# Patient Record
Sex: MALE | Race: WHITE | NOT HISPANIC OR LATINO
[De-identification: names, ages, dates, MRNs, and addresses within clinical notes are randomized per-mention and may not be internally consistent; named-entity substitution may affect disease eponyms.]

---

## 2020-06-16 ENCOUNTER — APPOINTMENT (OUTPATIENT)
Dept: GASTROENTEROLOGY | Facility: CLINIC | Age: 60
End: 2020-06-16
Payer: COMMERCIAL

## 2020-06-16 VITALS
HEART RATE: 79 BPM | DIASTOLIC BLOOD PRESSURE: 83 MMHG | SYSTOLIC BLOOD PRESSURE: 122 MMHG | BODY MASS INDEX: 29.82 KG/M2 | OXYGEN SATURATION: 98 % | TEMPERATURE: 97.7 F | WEIGHT: 179 LBS | HEIGHT: 65 IN

## 2020-06-16 DIAGNOSIS — Z86.39 PERSONAL HISTORY OF OTHER ENDOCRINE, NUTRITIONAL AND METABOLIC DISEASE: ICD-10-CM

## 2020-06-16 DIAGNOSIS — Z86.79 PERSONAL HISTORY OF OTHER DISEASES OF THE CIRCULATORY SYSTEM: ICD-10-CM

## 2020-06-16 DIAGNOSIS — K43.9 VENTRAL HERNIA W/OUT OBSTRUCTION OR GANGRENE: ICD-10-CM

## 2020-06-16 DIAGNOSIS — Z12.11 ENCOUNTER FOR SCREENING FOR MALIGNANT NEOPLASM OF COLON: ICD-10-CM

## 2020-06-16 PROCEDURE — 99203 OFFICE O/P NEW LOW 30 MIN: CPT

## 2020-06-16 RX ORDER — PROPRANOLOL HYDROCHLORIDE 80 MG/1
TABLET ORAL
Refills: 0 | Status: ACTIVE | COMMUNITY

## 2020-06-16 RX ORDER — SOLIFENACIN SUCCINATE 5 MG/1
5 TABLET, FILM COATED ORAL
Refills: 0 | Status: ACTIVE | COMMUNITY

## 2020-06-16 RX ORDER — OMEPRAZOLE 20 MG/1
20 CAPSULE, DELAYED RELEASE ORAL
Refills: 0 | Status: ACTIVE | COMMUNITY

## 2020-06-16 RX ORDER — SODIUM PICOSULFATE, MAGNESIUM OXIDE, AND ANHYDROUS CITRIC ACID 10; 3.5; 12 MG/160ML; G/160ML; G/160ML
10-3.5-12 MG-GM LIQUID ORAL
Qty: 1 | Refills: 0 | Status: ACTIVE | COMMUNITY
Start: 2020-06-16 | End: 1900-01-01

## 2020-06-16 RX ORDER — GLIPIZIDE 2.5 MG/1
TABLET ORAL
Refills: 0 | Status: ACTIVE | COMMUNITY

## 2020-06-16 NOTE — HISTORY OF PRESENT ILLNESS
[FreeTextEntry1] : Patient is a 60-year-old gentleman with history of hypertension and diabetes who presents for screening colonoscopy.  His bowel movements are regular.  He has no abdominal pain.  He denies seeing any blood or mucus in the stool.\par Patient has no upper gastrointestinal symptoms such as heartburn, dysphagia, early satiety, or nausea or vomiting.

## 2020-06-16 NOTE — PHYSICAL EXAM
[General Appearance - Alert] : alert [General Appearance - In No Acute Distress] : in no acute distress [Sclera] : the sclera and conjunctiva were normal [PERRL With Normal Accommodation] : pupils were equal in size, round, and reactive to light [Extraocular Movements] : extraocular movements were intact [Outer Ear] : the ears and nose were normal in appearance [Oropharynx] : the oropharynx was normal [Neck Appearance] : the appearance of the neck was normal [Neck Cervical Mass (___cm)] : no neck mass was observed [Jugular Venous Distention Increased] : there was no jugular-venous distention [Thyroid Diffuse Enlargement] : the thyroid was not enlarged [Thyroid Nodule] : there were no palpable thyroid nodules [Auscultation Breath Sounds / Voice Sounds] : lungs were clear to auscultation bilaterally [Heart Rate And Rhythm] : heart rate was normal and rhythm regular [Heart Sounds] : normal S1 and S2 [Heart Sounds Gallop] : no gallops [Murmurs] : no murmurs [Heart Sounds Pericardial Friction Rub] : no pericardial rub [Bowel Sounds] : normal bowel sounds [Abdomen Soft] : soft [Abdomen Tenderness] : non-tender [] : no hepato-splenomegaly [Abdomen Mass (___ Cm)] : no abdominal mass palpated [FreeTextEntry1] : Reducible ventral hernia [No CVA Tenderness] : no ~M costovertebral angle tenderness [No Spinal Tenderness] : no spinal tenderness [Abnormal Walk] : normal gait [Nail Clubbing] : no clubbing  or cyanosis of the fingernails [Musculoskeletal - Swelling] : no joint swelling seen [Motor Tone] : muscle strength and tone were normal [Oriented To Time, Place, And Person] : oriented to person, place, and time [Impaired Insight] : insight and judgment were intact [Affect] : the affect was normal

## 2020-07-12 DIAGNOSIS — Z01.818 ENCOUNTER FOR OTHER PREPROCEDURAL EXAMINATION: ICD-10-CM

## 2020-07-13 ENCOUNTER — APPOINTMENT (OUTPATIENT)
Dept: DISASTER EMERGENCY | Facility: CLINIC | Age: 60
End: 2020-07-13

## 2020-07-13 LAB — SARS-COV-2 N GENE NPH QL NAA+PROBE: NOT DETECTED

## 2020-07-16 ENCOUNTER — APPOINTMENT (OUTPATIENT)
Dept: GASTROENTEROLOGY | Facility: AMBULATORY MEDICAL SERVICES | Age: 60
End: 2020-07-16
Payer: COMMERCIAL

## 2020-07-16 PROCEDURE — 45378 DIAGNOSTIC COLONOSCOPY: CPT | Mod: 33

## 2020-12-23 PROBLEM — Z12.11 ENCOUNTER FOR SCREENING COLONOSCOPY: Status: RESOLVED | Noted: 2020-06-16 | Resolved: 2020-12-23

## 2022-03-26 ENCOUNTER — TRANSCRIPTION ENCOUNTER (OUTPATIENT)
Age: 62
End: 2022-03-26

## 2023-11-27 ENCOUNTER — APPOINTMENT (OUTPATIENT)
Dept: PODIATRY | Facility: CLINIC | Age: 63
End: 2023-11-27
Payer: COMMERCIAL

## 2023-11-27 DIAGNOSIS — M79.672 PAIN IN LEFT FOOT: ICD-10-CM

## 2023-11-27 DIAGNOSIS — M10.9 GOUT, UNSPECIFIED: ICD-10-CM

## 2023-11-27 PROCEDURE — 11720 DEBRIDE NAIL 1-5: CPT | Mod: 59

## 2023-11-27 PROCEDURE — 20605 DRAIN/INJ JOINT/BURSA W/O US: CPT | Mod: LT

## 2023-11-27 PROCEDURE — 99202 OFFICE O/P NEW SF 15 MIN: CPT | Mod: 25

## 2023-11-27 RX ORDER — COLCHICINE 0.6 MG/1
0.6 TABLET ORAL DAILY
Qty: 10 | Refills: 0 | Status: ACTIVE | COMMUNITY
Start: 2023-11-27 | End: 1900-01-01

## 2023-11-29 PROBLEM — M79.672 LEFT FOOT PAIN: Status: ACTIVE | Noted: 2023-11-28

## 2024-02-07 ENCOUNTER — INPATIENT (INPATIENT)
Facility: HOSPITAL | Age: 64
LOS: 0 days | Discharge: HOME | DRG: 641 | End: 2024-02-08
Attending: STUDENT IN AN ORGANIZED HEALTH CARE EDUCATION/TRAINING PROGRAM | Admitting: STUDENT IN AN ORGANIZED HEALTH CARE EDUCATION/TRAINING PROGRAM
Payer: COMMERCIAL

## 2024-02-07 VITALS
RESPIRATION RATE: 18 BRPM | TEMPERATURE: 98 F | HEIGHT: 65 IN | WEIGHT: 156.09 LBS | OXYGEN SATURATION: 100 % | HEART RATE: 89 BPM | DIASTOLIC BLOOD PRESSURE: 93 MMHG | SYSTOLIC BLOOD PRESSURE: 156 MMHG

## 2024-02-07 DIAGNOSIS — E66.3 OVERWEIGHT: ICD-10-CM

## 2024-02-07 DIAGNOSIS — Z90.49 ACQUIRED ABSENCE OF OTHER SPECIFIED PARTS OF DIGESTIVE TRACT: Chronic | ICD-10-CM

## 2024-02-07 DIAGNOSIS — L02.215 CUTANEOUS ABSCESS OF PERINEUM: ICD-10-CM

## 2024-02-07 DIAGNOSIS — E11.9 TYPE 2 DIABETES MELLITUS WITHOUT COMPLICATIONS: ICD-10-CM

## 2024-02-07 DIAGNOSIS — E87.5 HYPERKALEMIA: ICD-10-CM

## 2024-02-07 DIAGNOSIS — Z87.438 PERSONAL HISTORY OF OTHER DISEASES OF MALE GENITAL ORGANS: ICD-10-CM

## 2024-02-07 DIAGNOSIS — K59.00 CONSTIPATION, UNSPECIFIED: ICD-10-CM

## 2024-02-07 DIAGNOSIS — N17.9 ACUTE KIDNEY FAILURE, UNSPECIFIED: ICD-10-CM

## 2024-02-07 DIAGNOSIS — I10 ESSENTIAL (PRIMARY) HYPERTENSION: ICD-10-CM

## 2024-02-07 DIAGNOSIS — Z29.9 ENCOUNTER FOR PROPHYLACTIC MEASURES, UNSPECIFIED: ICD-10-CM

## 2024-02-07 LAB
ALBUMIN SERPL ELPH-MCNC: 4.4 G/DL — SIGNIFICANT CHANGE UP (ref 3.3–5)
ALP SERPL-CCNC: 85 U/L — SIGNIFICANT CHANGE UP (ref 40–120)
ALT FLD-CCNC: 17 U/L — SIGNIFICANT CHANGE UP (ref 10–45)
ANION GAP SERPL CALC-SCNC: 11 MMOL/L — SIGNIFICANT CHANGE UP (ref 5–17)
ANION GAP SERPL CALC-SCNC: 12 MMOL/L — SIGNIFICANT CHANGE UP (ref 5–17)
APPEARANCE UR: CLEAR — SIGNIFICANT CHANGE UP
AST SERPL-CCNC: 14 U/L — SIGNIFICANT CHANGE UP (ref 10–40)
BACTERIA # UR AUTO: NEGATIVE /HPF — SIGNIFICANT CHANGE UP
BILIRUB SERPL-MCNC: 0.4 MG/DL — SIGNIFICANT CHANGE UP (ref 0.2–1.2)
BILIRUB UR-MCNC: NEGATIVE — SIGNIFICANT CHANGE UP
BUN SERPL-MCNC: 33 MG/DL — HIGH (ref 7–23)
BUN SERPL-MCNC: 33 MG/DL — HIGH (ref 7–23)
CALCIUM SERPL-MCNC: 10.3 MG/DL — SIGNIFICANT CHANGE UP (ref 8.4–10.5)
CALCIUM SERPL-MCNC: 9.8 MG/DL — SIGNIFICANT CHANGE UP (ref 8.4–10.5)
CAST: 0 /LPF — SIGNIFICANT CHANGE UP (ref 0–4)
CHLORIDE SERPL-SCNC: 101 MMOL/L — SIGNIFICANT CHANGE UP (ref 96–108)
CHLORIDE SERPL-SCNC: 104 MMOL/L — SIGNIFICANT CHANGE UP (ref 96–108)
CK SERPL-CCNC: 31 U/L — SIGNIFICANT CHANGE UP (ref 30–200)
CO2 SERPL-SCNC: 21 MMOL/L — LOW (ref 22–31)
CO2 SERPL-SCNC: 22 MMOL/L — SIGNIFICANT CHANGE UP (ref 22–31)
COLOR SPEC: YELLOW — SIGNIFICANT CHANGE UP
CREAT ?TM UR-MCNC: 82 MG/DL — SIGNIFICANT CHANGE UP
CREAT SERPL-MCNC: 2.17 MG/DL — HIGH (ref 0.5–1.3)
CREAT SERPL-MCNC: 2.31 MG/DL — HIGH (ref 0.5–1.3)
DIFF PNL FLD: NEGATIVE — SIGNIFICANT CHANGE UP
EGFR: 31 ML/MIN/1.73M2 — LOW
EGFR: 33 ML/MIN/1.73M2 — LOW
GLUCOSE BLDC GLUCOMTR-MCNC: 196 MG/DL — HIGH (ref 70–99)
GLUCOSE BLDC GLUCOMTR-MCNC: 224 MG/DL — HIGH (ref 70–99)
GLUCOSE SERPL-MCNC: 208 MG/DL — HIGH (ref 70–99)
GLUCOSE SERPL-MCNC: 92 MG/DL — SIGNIFICANT CHANGE UP (ref 70–99)
GLUCOSE UR QL: 100 MG/DL
HCT VFR BLD CALC: 33.9 % — LOW (ref 39–50)
HGB BLD-MCNC: 11.6 G/DL — LOW (ref 13–17)
KETONES UR-MCNC: NEGATIVE MG/DL — SIGNIFICANT CHANGE UP
LEUKOCYTE ESTERASE UR-ACNC: NEGATIVE — SIGNIFICANT CHANGE UP
MCHC RBC-ENTMCNC: 31.2 PG — SIGNIFICANT CHANGE UP (ref 27–34)
MCHC RBC-ENTMCNC: 34.2 GM/DL — SIGNIFICANT CHANGE UP (ref 32–36)
MCV RBC AUTO: 91.1 FL — SIGNIFICANT CHANGE UP (ref 80–100)
NITRITE UR-MCNC: NEGATIVE — SIGNIFICANT CHANGE UP
NRBC # BLD: 0 /100 WBCS — SIGNIFICANT CHANGE UP (ref 0–0)
PH UR: 7 — SIGNIFICANT CHANGE UP (ref 5–8)
PLATELET # BLD AUTO: 261 K/UL — SIGNIFICANT CHANGE UP (ref 150–400)
POTASSIUM SERPL-MCNC: 5.4 MMOL/L — HIGH (ref 3.5–5.3)
POTASSIUM SERPL-MCNC: 5.8 MMOL/L — HIGH (ref 3.5–5.3)
POTASSIUM SERPL-SCNC: 5.4 MMOL/L — HIGH (ref 3.5–5.3)
POTASSIUM SERPL-SCNC: 5.8 MMOL/L — HIGH (ref 3.5–5.3)
POTASSIUM UR-SCNC: 44 MMOL/L — SIGNIFICANT CHANGE UP
PROT ?TM UR-MCNC: 13 MG/DL — HIGH (ref 0–12)
PROT SERPL-MCNC: 7.2 G/DL — SIGNIFICANT CHANGE UP (ref 6–8.3)
PROT UR-MCNC: NEGATIVE MG/DL — SIGNIFICANT CHANGE UP
RBC # BLD: 3.72 M/UL — LOW (ref 4.2–5.8)
RBC # FLD: 12.6 % — SIGNIFICANT CHANGE UP (ref 10.3–14.5)
RBC CASTS # UR COMP ASSIST: 0 /HPF — SIGNIFICANT CHANGE UP (ref 0–4)
SODIUM SERPL-SCNC: 134 MMOL/L — LOW (ref 135–145)
SODIUM SERPL-SCNC: 137 MMOL/L — SIGNIFICANT CHANGE UP (ref 135–145)
SODIUM UR-SCNC: 143 MMOL/L — SIGNIFICANT CHANGE UP
SP GR SPEC: 1.02 — SIGNIFICANT CHANGE UP (ref 1–1.03)
SQUAMOUS # UR AUTO: 0 /HPF — SIGNIFICANT CHANGE UP (ref 0–5)
UROBILINOGEN FLD QL: 0.2 MG/DL — SIGNIFICANT CHANGE UP (ref 0.2–1)
WBC # BLD: 7.48 K/UL — SIGNIFICANT CHANGE UP (ref 3.8–10.5)
WBC # FLD AUTO: 7.48 K/UL — SIGNIFICANT CHANGE UP (ref 3.8–10.5)
WBC UR QL: 0 /HPF — SIGNIFICANT CHANGE UP (ref 0–5)

## 2024-02-07 PROCEDURE — 99285 EMERGENCY DEPT VISIT HI MDM: CPT

## 2024-02-07 PROCEDURE — 99223 1ST HOSP IP/OBS HIGH 75: CPT

## 2024-02-07 RX ORDER — DEXTROSE 50 % IN WATER 50 %
15 SYRINGE (ML) INTRAVENOUS ONCE
Refills: 0 | Status: DISCONTINUED | OUTPATIENT
Start: 2024-02-07 | End: 2024-02-08

## 2024-02-07 RX ORDER — OXYBUTYNIN CHLORIDE 5 MG
5 TABLET ORAL
Refills: 0 | Status: DISCONTINUED | OUTPATIENT
Start: 2024-02-07 | End: 2024-02-08

## 2024-02-07 RX ORDER — ASPIRIN/CALCIUM CARB/MAGNESIUM 324 MG
81 TABLET ORAL DAILY
Refills: 0 | Status: DISCONTINUED | OUTPATIENT
Start: 2024-02-07 | End: 2024-02-08

## 2024-02-07 RX ORDER — LISINOPRIL 2.5 MG/1
1 TABLET ORAL
Refills: 0 | DISCHARGE

## 2024-02-07 RX ORDER — CHOLECALCIFEROL (VITAMIN D3) 125 MCG
2000 CAPSULE ORAL DAILY
Refills: 0 | Status: DISCONTINUED | OUTPATIENT
Start: 2024-02-07 | End: 2024-02-08

## 2024-02-07 RX ORDER — INSULIN LISPRO 100/ML
VIAL (ML) SUBCUTANEOUS AT BEDTIME
Refills: 0 | Status: DISCONTINUED | OUTPATIENT
Start: 2024-02-07 | End: 2024-02-08

## 2024-02-07 RX ORDER — SITAGLIPTIN 50 MG/1
1 TABLET, FILM COATED ORAL
Refills: 0 | DISCHARGE

## 2024-02-07 RX ORDER — INSULIN HUMAN 100 [IU]/ML
5 INJECTION, SOLUTION SUBCUTANEOUS ONCE
Refills: 0 | Status: COMPLETED | OUTPATIENT
Start: 2024-02-07 | End: 2024-02-07

## 2024-02-07 RX ORDER — DEXTROSE 50 % IN WATER 50 %
50 SYRINGE (ML) INTRAVENOUS ONCE
Refills: 0 | Status: COMPLETED | OUTPATIENT
Start: 2024-02-07 | End: 2024-02-07

## 2024-02-07 RX ORDER — SODIUM BICARBONATE 1 MEQ/ML
50 SYRINGE (ML) INTRAVENOUS ONCE
Refills: 0 | Status: COMPLETED | OUTPATIENT
Start: 2024-02-07 | End: 2024-02-07

## 2024-02-07 RX ORDER — DEXTROSE 50 % IN WATER 50 %
25 SYRINGE (ML) INTRAVENOUS ONCE
Refills: 0 | Status: DISCONTINUED | OUTPATIENT
Start: 2024-02-07 | End: 2024-02-08

## 2024-02-07 RX ORDER — SODIUM CHLORIDE 9 MG/ML
1000 INJECTION INTRAMUSCULAR; INTRAVENOUS; SUBCUTANEOUS ONCE
Refills: 0 | Status: COMPLETED | OUTPATIENT
Start: 2024-02-07 | End: 2024-02-07

## 2024-02-07 RX ORDER — PROPRANOLOL HCL 160 MG
0.5 CAPSULE, EXTENDED RELEASE 24HR ORAL
Refills: 0 | DISCHARGE

## 2024-02-07 RX ORDER — DEXTROSE 50 % IN WATER 50 %
25 SYRINGE (ML) INTRAVENOUS ONCE
Refills: 0 | Status: DISCONTINUED | OUTPATIENT
Start: 2024-02-07 | End: 2024-02-07

## 2024-02-07 RX ORDER — ASPIRIN/CALCIUM CARB/MAGNESIUM 324 MG
1 TABLET ORAL
Refills: 0 | DISCHARGE

## 2024-02-07 RX ORDER — METFORMIN HYDROCHLORIDE 850 MG/1
1 TABLET ORAL
Refills: 0 | DISCHARGE

## 2024-02-07 RX ORDER — GLUCAGON INJECTION, SOLUTION 0.5 MG/.1ML
1 INJECTION, SOLUTION SUBCUTANEOUS ONCE
Refills: 0 | Status: DISCONTINUED | OUTPATIENT
Start: 2024-02-07 | End: 2024-02-08

## 2024-02-07 RX ORDER — LANOLIN ALCOHOL/MO/W.PET/CERES
3 CREAM (GRAM) TOPICAL AT BEDTIME
Refills: 0 | Status: DISCONTINUED | OUTPATIENT
Start: 2024-02-07 | End: 2024-02-08

## 2024-02-07 RX ORDER — TAMSULOSIN HYDROCHLORIDE 0.4 MG/1
0.4 CAPSULE ORAL AT BEDTIME
Refills: 0 | Status: DISCONTINUED | OUTPATIENT
Start: 2024-02-07 | End: 2024-02-08

## 2024-02-07 RX ORDER — SODIUM BICARBONATE 1 MEQ/ML
0.53 SYRINGE (ML) INTRAVENOUS
Qty: 150 | Refills: 0 | Status: DISCONTINUED | OUTPATIENT
Start: 2024-02-07 | End: 2024-02-07

## 2024-02-07 RX ORDER — PANTOPRAZOLE SODIUM 20 MG/1
40 TABLET, DELAYED RELEASE ORAL
Refills: 0 | Status: DISCONTINUED | OUTPATIENT
Start: 2024-02-07 | End: 2024-02-08

## 2024-02-07 RX ORDER — ALBUTEROL 90 UG/1
10 AEROSOL, METERED ORAL ONCE
Refills: 0 | Status: DISCONTINUED | OUTPATIENT
Start: 2024-02-07 | End: 2024-02-07

## 2024-02-07 RX ORDER — PSYLLIUM SEED (WITH DEXTROSE)
1 POWDER (GRAM) ORAL DAILY
Refills: 0 | Status: DISCONTINUED | OUTPATIENT
Start: 2024-02-07 | End: 2024-02-08

## 2024-02-07 RX ORDER — INSULIN LISPRO 100/ML
VIAL (ML) SUBCUTANEOUS
Refills: 0 | Status: DISCONTINUED | OUTPATIENT
Start: 2024-02-07 | End: 2024-02-08

## 2024-02-07 RX ORDER — ACETAMINOPHEN 500 MG
650 TABLET ORAL EVERY 6 HOURS
Refills: 0 | Status: DISCONTINUED | OUTPATIENT
Start: 2024-02-07 | End: 2024-02-08

## 2024-02-07 RX ORDER — SODIUM ZIRCONIUM CYCLOSILICATE 10 G/10G
10 POWDER, FOR SUSPENSION ORAL ONCE
Refills: 0 | Status: COMPLETED | OUTPATIENT
Start: 2024-02-07 | End: 2024-02-07

## 2024-02-07 RX ORDER — SODIUM ZIRCONIUM CYCLOSILICATE 10 G/10G
5 POWDER, FOR SUSPENSION ORAL ONCE
Refills: 0 | Status: COMPLETED | OUTPATIENT
Start: 2024-02-07 | End: 2024-02-07

## 2024-02-07 RX ORDER — DEXTROSE 50 % IN WATER 50 %
12.5 SYRINGE (ML) INTRAVENOUS ONCE
Refills: 0 | Status: DISCONTINUED | OUTPATIENT
Start: 2024-02-07 | End: 2024-02-08

## 2024-02-07 RX ADMIN — Medication 5 MILLIGRAM(S): at 18:01

## 2024-02-07 RX ADMIN — SODIUM ZIRCONIUM CYCLOSILICATE 5 GRAM(S): 10 POWDER, FOR SUSPENSION ORAL at 10:39

## 2024-02-07 RX ADMIN — TAMSULOSIN HYDROCHLORIDE 0.4 MILLIGRAM(S): 0.4 CAPSULE ORAL at 21:41

## 2024-02-07 RX ADMIN — SODIUM CHLORIDE 1000 MILLILITER(S): 9 INJECTION INTRAMUSCULAR; INTRAVENOUS; SUBCUTANEOUS at 05:52

## 2024-02-07 RX ADMIN — Medication 3 MILLIGRAM(S): at 21:42

## 2024-02-07 RX ADMIN — SODIUM ZIRCONIUM CYCLOSILICATE 10 GRAM(S): 10 POWDER, FOR SUSPENSION ORAL at 05:09

## 2024-02-07 RX ADMIN — INSULIN HUMAN 5 UNIT(S): 100 INJECTION, SOLUTION SUBCUTANEOUS at 04:56

## 2024-02-07 RX ADMIN — Medication 50 MILLILITER(S): at 05:13

## 2024-02-07 RX ADMIN — Medication 50 MILLILITER(S): at 04:56

## 2024-02-07 RX ADMIN — Medication 1 TABLET(S): at 14:48

## 2024-02-07 RX ADMIN — Medication 50 MILLIEQUIVALENT(S): at 05:13

## 2024-02-07 RX ADMIN — Medication 1: at 16:14

## 2024-02-07 NOTE — H&P ADULT - PROBLEM SELECTOR PLAN 1
ESE on CKD III, Cr was around 1.5 a month ago, now ~2.2 in setting of bactrim use. C/b hyperkalemia  - UA without proteinuria or signs of GN  - Monitor labs and urine output.   - Avoid NSAIDs, ACEI/ARBS, RCA and nephrotoxins  - Renally dose medications  - S/p lokelma 10 and 5 mg  - Low K diet

## 2024-02-07 NOTE — ED ADULT NURSE NOTE - OBJECTIVE STATEMENT
63 y.o M AAO4 ambulatory presents to the ED after getting a critical lab result from nephrologist office of a K of 7.7. Pt states he has PMH of HTN, DM type 2, and new elevated "kidney numbers". Pt states he's been feeling weak lately and nauseas, as well as intermittent SOB no symptoms of flu like symptoms. Pt denies any other symptoms. Pt denies CP,, HA, vision changes, /v/d, fevers chills, abdominal pain. Upon assessment, abdomen soft and nontender, +strong peripheral pulses, moving all extremities without difficulty.

## 2024-02-07 NOTE — ED PROVIDER NOTE - OBJECTIVE STATEMENT
63-year-old male, history of diabetes and hypertension, presenting to the ED today for abnormal lab results.  Patient states that he was called by the nephrologist office due to elevated potassium to 7.7.  States that he has been experiencing weakness and nausea for the last couple weeks.  Otherwise no other symptoms including headache, LOC, chest pain, shortness of numbness or tingling.  Of note, patient's creatinine has been uptrending. 63-year-old male, history of diabetes and hypertension, presenting to the ED today for abnormal lab results.  Patient states that he was called by the nephrologist office due to elevated potassium to 7.7.  States that he has been experiencing weakness and nausea for the last couple weeks. Had labs drawn due to his symptoms. Otherwise denies headache, LOC, chest pain, shortness breath, numbness or tingling, urinary symptoms.  Of note, patient's creatinine has been uptrending.

## 2024-02-07 NOTE — H&P ADULT - PROBLEM SELECTOR PLAN 5
Was rx phentermine for weight loss  - would stop as he has been having nonspecific symptoms including gi upset recently  - suggest GLP1RA as an alternative given his diabetes Was rx phentermine for weight loss  - would stop as he has been having nonspecific symptoms including gi upset recently  - suggest GLP1RA as an alternative given his diabetes on discharge On lisinopril 5 mg at home  - hold lisinopril for now given high K and ESE  - monitor BP

## 2024-02-07 NOTE — H&P ADULT - HISTORY OF PRESENT ILLNESS
63M with hx of HTN, T2DM on oral medications, BPH sent in by nephrology office for K of 7.7 on outpatient labs. In the last month, patient was started on an appetite suppressant phentermine. He was diagnosed with a perineal abscess about 2 weeks ago, which spontaneously ruptured in the doctor's office. He was rx on bactrim DS for 2 weeks, which he has about 1 day left of medication. In the last two weeks, he has also been having intermittent fatigue, nausea, emesis, and generalized malaise. He denies any other recent illnesses or respiratory symptoms. Today, however, he feels better, and denies any acute symptoms.     In the interim, his creatinine radha from baseline of around 1.5 about a month ago, to 2.2 and sent to a nephrologist for initial consultation, where he was reported to have a K of 7.7 and called to immediately go to the ED for life threatening hyperkalemia. In the ED, K was 5.8 without EKG changes, and he received insulin 5u, albuterol, sodium bicarb, and lokelma.        63M with hx of HTN, T2DM on oral medications, BPH sent in by nephrology office for K of 7.7 on outpatient labs. In the last month, patient was started on an appetite suppressant phentermine. He was diagnosed with a perineal abscess about 2 weeks ago, which spontaneously ruptured in the doctor's office. He was rx on bactrim DS for 2 weeks, which he has about 1 day left of medication. In the last two weeks, he has also been having intermittent fatigue, nausea, emesis, and generalized malaise. He denies any other recent illnesses or respiratory symptoms. Today, however, he feels better, and denies any acute symptoms. He still complains of swelling in his perineal area, with no drainage.     In the interim, his creatinine radha from baseline of around 1.5 about a month ago, to 2.2 and sent to a nephrologist for initial consultation, where he was reported to have a K of 7.7 and called to immediately go to the ED for life threatening hyperkalemia. In the ED, K was 5.8 without EKG changes, and he received insulin 5u, albuterol, sodium bicarb, and lokelma.        63M with hx of HTN, T2DM on oral medications, BPH sent in by nephrology office for K of 7.7 on outpatient labs. In the last month, patient was started on an appetite suppressant phentermine. He was diagnosed with a perineal abscess about 2 weeks ago, which spontaneously ruptured in the doctor's office. He was rx on bactrim DS for 2 weeks, which he has about 1 day left of medication. In the last two weeks, he has also been having intermittent fatigue, nausea, emesis, and generalized malaise. He denies any other recent illnesses or respiratory symptoms. Today, however, he feels better, and denies any acute symptoms. He still complains of swelling in his perineal area, with no drainage.     In the interim, his creatinine radha from baseline of around 1.5 about a month ago, to 2.2 and sent to a nephrologist for initial consultation, where he was reported to have a K of 7.7 and called to immediately go to the ED for life threatening hyperkalemia. In the ED, K was 5.8 without EKG changes, and he received insulin 5u, albuterol, sodium bicarb, and lokelma. Repeat potassium was 5.4.

## 2024-02-07 NOTE — H&P ADULT - PROBLEM SELECTOR PLAN 3
Was rx bactrim for 14 days, last dose 2/8  - stop bactrim for ESE  - can give augmentin to complete 14 day course  - US of abscess to determine if any drainable collection remains

## 2024-02-07 NOTE — CONSULT NOTE ADULT - SUBJECTIVE AND OBJECTIVE BOX
[facility] NEPHROLOGY- CONSULTATION NOTE    63 year old male with history of HTN, BPH, Type 2 Diabetes Mellitus presents to the hospital due to emergent abnormal labs. The patient was seen for an initial evaluation at [facility] on 24. The [facility] lab called an urgent value of a Potassium of 7.7 mEq/L that was non-hemolyzed. The patient was advised to come to the hospital as soon as possible for emergent workup. At the time of my examination, the patient denies any sensation of muscle cramps, palpitations, weakness, or chest pain. The patient cannot recall being told of any history of hyperkalemia.    PAST MEDICAL & SURGICAL HISTORY:  HTN (hypertension)  Diabetes mellitus type II, controlled  BPH without urinary obstruction  History of penile implant  S/P appendectomy    Allergies: No Known Allergies    Home Medications Reviewed  Hospital Medications:   MEDICATIONS  (STANDING):  amoxicillin  875 milliGRAM(s)/clavulanate 1 Tablet(s) Oral two times a day  aspirin enteric coated 81 milliGRAM(s) Oral daily  cholecalciferol 2000 Unit(s) Oral daily  dextrose 50% Injectable 12.5 Gram(s) IV Push once  dextrose 50% Injectable 25 Gram(s) IV Push once  dextrose Oral Gel 15 Gram(s) Oral once  glucagon  Injectable 1 milliGRAM(s) IntraMuscular once  insulin lispro (ADMELOG) corrective regimen sliding scale   SubCutaneous at bedtime  insulin lispro (ADMELOG) corrective regimen sliding scale   SubCutaneous three times a day before meals  oxybutynin 5 milliGRAM(s) Oral two times a day  pantoprazole    Tablet 40 milliGRAM(s) Oral before breakfast  psyllium Powder 1 Packet(s) Oral daily  tamsulosin 0.4 milliGRAM(s) Oral at bedtime    SOCIAL HISTORY: Seldom alcohol usage, Smokes marijuana daily. Denies smoking. Works at [employer].    FAMILY HISTORY:  Family history of diabetes mellitus in father (Father)  Family history of stent (Father)    REVIEW OF SYSTEMS:  CONSTITUTIONAL: No weakness, fevers or chills  EYES/ENT: No visual changes;  No vertigo or throat pain   NECK: No pain or stiffness  RESPIRATORY: No cough, wheezing, hemoptysis; No shortness of breath  CARDIOVASCULAR: No chest pain or palpitations.  GASTROINTESTINAL: No abdominal or epigastric pain. No nausea, vomiting, or hematemesis; No diarrhea or constipation. No melena or hematochezia.  GENITOURINARY: No dysuria, frequency, foamy urine, urinary urgency, incontinence or hematuria  NEUROLOGICAL: No numbness or weakness  SKIN: No itching, burning, rashes, or lesions   VASCULAR: No bilateral lower extremity edema.   All other review of systems is negative unless indicated above.    VITALS:  T(F): 98.3 (24 @ 16:15), Max: 98.3 (24 @ 07:25)  HR: 99 (24 @ 16:15)  BP: 159/80 (24 @ 16:15)  RR: 18 (24 @ 16:15)  SpO2: 98% (24 @ 16:15)  Wt(kg): --    Height (cm): 165.1 ( @ :18)  Weight (kg): 70.8 ( @ :18)  BMI (kg/m2): 26 (:18)  BSA (m2): 1.78 (:18)    PHYSICAL EXAM:  GENERAL: NAD, well-developed  HEAD: NCAT  EYES: EOMI, PERRL, conjunctiva and sclera clear  NECK: Supple, No JVD  CHEST/LUNG: Clear to auscultation bilaterally; No wheeze  HEART: Regular rate and rhythm; No murmurs, rubs, or gallops  ABDOMEN: Soft, Nontender, Nondistended; Bowel sounds present  EXTREMITIES:  2+ Peripheral Pulses, No clubbing, cyanosis, or edema  : exam deferred as patient is in the hallway without privacy  PSYCH: AAOx3, appropriate affect  NEUROLOGY: non-focal, ackerman  SKIN: No rashes or lesions    LABS:      134<L>  |  101  |  33<H>  ----------------------------<  208<H>  5.4<H>   |  22  |  2.17<H>    Ca    9.8      2024 06:10    TPro  7.2  /  Alb  4.4  /  TBili  0.4  /  DBili      /  AST  14  /  ALT  17  /  AlkPhos  85      Creatinine Trend: 2.17 <--, 2.31 <--                        11.6   7.48  )-----------( 261      ( 2024 03:13 )             33.9     Urine Studies:  Urinalysis Basic - ( 2024 08:56 )    Color: Yellow / Appearance: Clear / S.017 / pH:   Gluc:  / Ketone: Negative mg/dL  / Bili: Negative / Urobili: 0.2 mg/dL   Blood:  / Protein: Negative mg/dL / Nitrite: Negative   Leuk Esterase: Negative / RBC: 0 /HPF / WBC 0 /HPF   Sq Epi:  / Non Sq Epi: 0 /HPF / Bacteria: Negative /HPF      Creatinine, Random Urine: 82 mg/dL ( @ 08:56)  Potassium, Random Urine: 44 mmol/L ( @ 08:56)  Sodium, Random Urine: 143 mmol/L ( @ 08:56)    RADIOLOGY & ADDITIONAL STUDIES:

## 2024-02-07 NOTE — H&P ADULT - PROBLEM SELECTOR PLAN 7
on tamsulosin at home, and solifenacin presumably for overactive bladder  - c/w tamsulosin and solifenacin therapeutic interchange - c/w psyllium

## 2024-02-07 NOTE — H&P ADULT - NSHPPHYSICALEXAM_GEN_ALL_CORE
Vital Signs Last 24 Hrs  T(C): 36.4 (07 Feb 2024 12:02), Max: 36.8 (07 Feb 2024 03:26)  T(F): 97.5 (07 Feb 2024 12:02), Max: 98.3 (07 Feb 2024 07:25)  HR: 84 (07 Feb 2024 12:02) (77 - 92)  BP: 128/81 (07 Feb 2024 12:02) (128/81 - 156/93)  RR: 16 (07 Feb 2024 12:02) (16 - 18)  SpO2: 99% (07 Feb 2024 12:02) (98% - 100%)    Parameters below as of 07 Feb 2024 12:02  Patient On (Oxygen Delivery Method): room air    GENERAL: NAD, well-developed  HEAD: NCAT  EYES: EOMI, PERRL, conjunctiva and sclera clear  NECK: Supple, No JVD  CHEST/LUNG: Clear to auscultation bilaterally; No wheeze  HEART: Regular rate and rhythm; No murmurs, rubs, or gallops  ABDOMEN: Soft, Nontender, Nondistended; Bowel sounds present  EXTREMITIES:  2+ Peripheral Pulses, No clubbing, cyanosis, or edema  : exam deferred as patient is in the hallway without privacy  PSYCH: AAOx3, appropriate affect  NEUROLOGY: non-focal, ackerman  SKIN: No rashes or lesions

## 2024-02-07 NOTE — ED PROVIDER NOTE - CARE PLAN
Principal Discharge DX:	Hyperkalemia   1 Principal Discharge DX:	Hyperkalemia  Secondary Diagnosis:	ESE (acute kidney injury)

## 2024-02-07 NOTE — ED PROVIDER NOTE - PROGRESS NOTE DETAILS
Leydricah [place], DO (PGY1): potassium 5.8 in setting of elevated creatinine 2.3.. Per patient, baseline around 1.6. Will treat hyperK with albuterol, insulin, bicarb, and lokelma. Patient will need admission for ESE workup.

## 2024-02-07 NOTE — H&P ADULT - NSICDXFAMILYHX_GEN_ALL_CORE_FT
FAMILY HISTORY:  Father  Still living? Unknown  Family history of diabetes mellitus in father, Age at diagnosis: Age Unknown  Family history of stent, Age at diagnosis: Age Unknown

## 2024-02-07 NOTE — H&P ADULT - PROBLEM SELECTOR PLAN 8
DVT: IMPROVE 1   Diet: DASH/TLC/CC/low K  Dispo: home on tamsulosin at home, and solifenacin presumably for overactive bladder  - c/w tamsulosin and solifenacin therapeutic interchange

## 2024-02-07 NOTE — H&P ADULT - PROBLEM SELECTOR PLAN 6
- c/w psyllium Was rx phentermine for weight loss  - would stop as he has been having nonspecific symptoms including gi upset recently  - suggest GLP1RA as an alternative given his diabetes on discharge

## 2024-02-07 NOTE — CONSULT NOTE ADULT - ASSESSMENT
63 year old male with history of HTN, BPH, Type 2 Diabetes Mellitus presents to the hospital due to emergent abnormal labs, specifically a potassium of 7.7 mEq/L.    1. Hyperkalemia: Potassium on results from ER are not life threatening; values are 5.8 and 5.4 mEq/L. Patient does not require dialysis or any emergent interventions. Pamphlet of low Potassium recommendations provided to the patient.    2. HTN w/ CKD: Goal BP is < 130/80. Lisinopril is a renal and cardio protective medication; would recommend continuing this medication and attempting low K+ diet to control any elevations in Potassium.     3. CKD stage IIIB: Kidney function was reportedly at eGFR 46 prior to referral to [facility]. Of note, patient had been taking bactrim for management of an abscess. Bactrim is associated with elevations in serum creatinine and serum potassium. Monitor kidney function and potassium levels when off of Bactrim. Avoid any additional nephrotoxins unless emergently indicated.    4. BPH: Continue Flomax and Vesicare. Monitor urinary output accurately.    [facility] NEPHROLOGY  Elmer Vidal M.D.  Isra Lujan D.O.  Zuly Preston M.D.  MD Pushpa Raphael, MSN, ANP-C    Telephone: [phone]  Facsimile: [phone] [place] [address], [address]  [place] [phone]

## 2024-02-07 NOTE — ED ADULT NURSE NOTE - NSFALLUNIVINTERV_ED_ALL_ED
VACCINE ADMINISTRATION RECORD  PARENT / GUARDIAN APPROVAL  Date: 2021  Vaccine administered to:  Denise Hunter     : 2007    MRN: [id number]    A copy of the appropriate Centers for Disease Control and Prevention Vaccine Information statement h Bed/Stretcher in lowest position, wheels locked, appropriate side rails in place/Call bell, personal items and telephone in reach/Instruct patient to call for assistance before getting out of bed/chair/stretcher/Non-slip footwear applied when patient is off stretcher/[place] to call system/Physically safe environment - no spills, clutter or unnecessary equipment/Purposeful proactive rounding/Room/bathroom lighting operational, light cord in reach

## 2024-02-07 NOTE — H&P ADULT - NSHPLABSRESULTS_GEN_ALL_CORE
11.6   7.48  )-----------( 261      ( 2024 03:13 )             33.9     02-07    134<L>  |  101  |  33<H>  ----------------------------<  208<H>  5.4<H>   |  22  |  2.17<H>    Ca    9.8      2024 06:10    TPro  7.2  /  Alb  4.4  /  TBili  0.4  /  DBili  x   /  AST  14  /  ALT  17  /  AlkPhos  85  02-07      Urinalysis Basic - ( 2024 08:56 )    Color: Yellow / Appearance: Clear / S.017 / pH: x  Gluc: x / Ketone: Negative mg/dL  / Bili: Negative / Urobili: 0.2 mg/dL   Blood: x / Protein: Negative mg/dL / Nitrite: Negative   Leuk Esterase: Negative / RBC: 0 /HPF / WBC 0 /HPF   Sq Epi: x / Non Sq Epi: 0 /HPF / Bacteria: Negative /HPF      CARDIAC MARKERS ( 2024 03:13 )  x     / x     / 31 U/L / x     / x          CAPILLARY BLOOD GLUCOSE      POCT Blood Glucose.: 274 mg/dL (2024 05:41)  POCT Blood Glucose.: 332 mg/dL (2024 05:24)  POCT Blood Glucose.: 80 mg/dL (2024 04:54)

## 2024-02-07 NOTE — H&P ADULT - ASSESSMENT
63M with hx of HTN, T2DM on oral medications, BPH sent in by nephrology office for K of 7.7 on outpatient labs, admitted for K monitoring and r/o abscess.

## 2024-02-07 NOTE — H&P ADULT - PROBLEM/PLAN-7
Dr. Davenport office called,patient canceled appointment,and has not been seen in their office. They have no records for patient.   DISPLAY PLAN FREE TEXT

## 2024-02-07 NOTE — ED PROVIDER NOTE - PHYSICAL EXAMINATION
GENERAL: no acute distress, non-toxic appearing  HEAD: normocephalic, atraumatic  HEENT: oral mucosa moist, full ROM of neck  CARDIAC: regular rate rhythm, normal S1/S2  CHEST: CTA BL, no wheeze or crackles  ABDOMEN: normal BS, soft, no tenderness  MSK: no visible deformities, no peripheral edema  NEURO: alert and orientedx3

## 2024-02-07 NOTE — H&P ADULT - NSHPREVIEWOFSYSTEMS_GEN_ALL_CORE
REVIEW OF SYSTEMS:  CONSTITUTIONAL: No fever, chills   EYES: No eye pain, visual changes  ENMT:  No difficulty hearing, sinus or throat pain  NECK: No pain or stiffness  RESPIRATORY: No cough, wheezing; No shortness of breath  CARDIOVASCULAR: No chest pain, palpitations, leg swelling  GASTROINTESTINAL: +nausea, emesis/retching, constipation, No abdominal pain  GENITOURINARY: No dysuriam, frequency REVIEW OF SYSTEMS:  CONSTITUTIONAL: No fever, chills   EYES: No eye pain, visual changes  ENMT:  No difficulty hearing, sinus or throat pain  NECK: No pain or stiffness  RESPIRATORY: No cough, wheezing; No shortness of breath  CARDIOVASCULAR: No chest pain, palpitations, leg swelling  GASTROINTESTINAL: +nausea, emesis/retching, constipation, No abdominal pain  GENITOURINARY: No dysuria, frequency

## 2024-02-07 NOTE — H&P ADULT - PROBLEM SELECTOR PLAN 4
Home DM meds: metformin 1g bid, Januvia 50 mg daily, glipizide 5 mg bid. Symptoms in the last 2 weeks may also be hypoglycemic episodes (malaise, tiredness, nausea)  - Inpatient FS goal 100-180  - FS/VERITO qac/qhs  - CC diet  - F/u A1c   - given renal dysfunction, would consider stopping glipizide on discharge to prevent hypoglycemia episodes  - suggest FS monitoring as outpatient to r/o hypoglycemia as cause of symptoms Home DM meds: metformin 1g bid, Januvia 50 mg daily, glipizide 5 mg bid. Symptoms in the last 2 weeks may also be hypoglycemic episodes (malaise, tiredness, nausea) given his medications + new renal dysfunction  - Inpatient FS goal 100-180  - FS/VERITO qac/qhs  - CC diet  - F/u A1c   - given renal dysfunction, would consider stopping glipizide on discharge to prevent hypoglycemia episodes  - suggest FS monitoring as outpatient to r/o hypoglycemia as cause of symptoms

## 2024-02-07 NOTE — H&P ADULT - NSICDXPASTMEDICALHX_GEN_ALL_CORE_FT
PAST MEDICAL HISTORY:  BPH without urinary obstruction     Diabetes mellitus type II, controlled     HTN (hypertension)

## 2024-02-07 NOTE — ED ADULT NURSE REASSESSMENT NOTE - NS ED NURSE REASSESS COMMENT FT1
Received report from MAKENZIE Akhtar. Pt is awake, alert, and speaking in full coherent sentences. NAD noted. Pt is resting comfortably in stretcher, side rails up and bed in lowest position. Pt is TBA.

## 2024-02-07 NOTE — ED PROVIDER NOTE - ATTENDING CONTRIBUTION TO CARE
MD Parker:  patient seen and evaluated personally.   I agree with the History & Physical,  Impression & Plan other than what was detailed in my note.  MD Parker  63-year-old male history of poorly controlled diabetes, hypertension, presenting to the emergency department with report of elevated potassium of 7.7.  I was able to review the labs and HIV.  His creatinine was also elevated 2.6.  He states he has a history of elevated kidney function but it is only mildly.  He states that these labs were drawn as a result of him feeling unwell over the past week or so.  He has been feeling more rundown.  Having intermittent nausea.  Wanting to vomit but is unable to.  He has not any fevers or chills.  Is not any chest pain he has not any leg swelling.  He is not having any body aches.  Currently no abdominal pain.  He is afebrile, vitals are stable he is nontoxic he is well-appearing, his lungs are clear to auscultation  no wheezing rales or rhonchino murmurs rubs or gallops patient has no abdominal tenderness on exam.  No pitting edema.  Requested patient get old labs if he has any to compare this elevated creatinine 2.  Will repeat labs currently.  Suspicion that these labs are accurate as there was no report of hemolysis on the potassium and could be hyperkalemia from elevated creatinine.  Will treat as if it is significant hyperkalemia until proven otherwise.  An EKG was already performed which did not show any peaked T waves.  As read by me shows 77 bpm, normal axis deviation, no significant ST changes.  QRS intervals unremarkable.  Will develop once lab results come back.

## 2024-02-07 NOTE — CHART NOTE - NSCHARTNOTEFT_GEN_A_CORE
[facility] Nephrology Free Text Note    I received a phone call from [facility] regarding Mr. Hook's potassium. The results were significant for a life threatening hyperkalemia of 7.7 mEq/L. I immediately called Mr. Hook and advised him to come to the hospital for further evaluation. The patient was checked into the ER and had repeat labs work drawn. His potassium levels resulted at 5.8 mEq/L and 5.4 mEq/L. The patient does have a creatinine of 2.1 mg/dL, but the patient has known CKD stage IIIB. The potassium levels are no longer life threatening, so the patient is cleared for discharge from a Nephrology standpoint. I have provided a pamphlet of low Potassium recommendations for the patient to follow. His next scheduled appointment with [facility] is on Monday, March 4, 2024.    Full consultation note to follow.    [facility] NEPHROLOGY  Elmer Vidal M.D.  Isra Lujan D.O.  Zuly Preston M.D.  MD Pushpa Raphael, MSN, ANP-C    Telephone: [phone]  Facsimile: [phone]    [address] [place] [address], [address]  [place] [address]

## 2024-02-07 NOTE — ED PROVIDER NOTE - CLINICAL SUMMARY MEDICAL DECISION MAKING FREE TEXT BOX
63-year-old male, history of diabetes and hypertension, presenting to the ED today for abnormal lab results. Potassium found to be 7.7. Currently complaining of nausea and feeling fatigues. No other symptoms. Vitals are non-actionable. On exam, patient lying on stretcher in NAD. Lungs are cta b/l. Normal heart sounds. No abdominal pain. No lower extremity edema. EKG showing  NSR. Will repeat labs here. Will treat hyperK as appropriate. Patient may need admission.

## 2024-02-07 NOTE — ED PROVIDER NOTE - IV ALTEPLASE EXCL ABS HIDDEN
show Doxepin Counseling:  Patient advised that the medication is sedating and not to drive a car after taking this medication. Patient informed of potential adverse effects including but not limited to dry mouth, urinary retention, and blurry vision.  The patient verbalized understanding of the proper use and possible adverse effects of doxepin.  All of the patient's questions and concerns were addressed.

## 2024-02-07 NOTE — ED ADULT NURSE REASSESSMENT NOTE - NS ED NURSE REASSESS COMMENT FT1
Pt resting comfortably in stretcher with side rails up, VSS stable with no complaints of pain at this time.

## 2024-02-07 NOTE — H&P ADULT - NSHPADDITIONALINFOADULT_GEN_ALL_CORE
The necessity of the time spent during the encounter on this date of service was due to:   - Ordering, reviewing, and interpreting labs, testing, and imaging  - Independently obtaining a review of systems and performing a physical exam  - Reviewing prior hospitalization and where necessary, outpatient records  - Reviewing consultant recommendations/communicating with consultants  - Counselling and educating patient and family regarding interpretation of aforementioned items and plan of care    Time-based billing (NON-critical care). Total minutes spent: 75

## 2024-02-08 ENCOUNTER — TRANSCRIPTION ENCOUNTER (OUTPATIENT)
Age: 64
End: 2024-02-08

## 2024-02-08 VITALS
HEART RATE: 100 BPM | DIASTOLIC BLOOD PRESSURE: 78 MMHG | RESPIRATION RATE: 18 BRPM | SYSTOLIC BLOOD PRESSURE: 114 MMHG | OXYGEN SATURATION: 100 % | TEMPERATURE: 98 F

## 2024-02-08 LAB
24R-OH-CALCIDIOL SERPL-MCNC: 71.3 NG/ML — SIGNIFICANT CHANGE UP (ref 30–80)
A1C WITH ESTIMATED AVERAGE GLUCOSE RESULT: 6.8 % — HIGH (ref 4–5.6)
ANION GAP SERPL CALC-SCNC: 11 MMOL/L — SIGNIFICANT CHANGE UP (ref 5–17)
BUN SERPL-MCNC: 26 MG/DL — HIGH (ref 7–23)
CALCIUM SERPL-MCNC: 9.7 MG/DL — SIGNIFICANT CHANGE UP (ref 8.4–10.5)
CHLORIDE SERPL-SCNC: 100 MMOL/L — SIGNIFICANT CHANGE UP (ref 96–108)
CO2 SERPL-SCNC: 23 MMOL/L — SIGNIFICANT CHANGE UP (ref 22–31)
CREAT SERPL-MCNC: 1.77 MG/DL — HIGH (ref 0.5–1.3)
EGFR: 42 ML/MIN/1.73M2 — LOW
ESTIMATED AVERAGE GLUCOSE: 148 MG/DL — HIGH (ref 68–114)
GLUCOSE BLDC GLUCOMTR-MCNC: 222 MG/DL — HIGH (ref 70–99)
GLUCOSE BLDC GLUCOMTR-MCNC: 248 MG/DL — HIGH (ref 70–99)
GLUCOSE SERPL-MCNC: 202 MG/DL — HIGH (ref 70–99)
HCT VFR BLD CALC: 34.6 % — LOW (ref 39–50)
HCV AB S/CO SERPL IA: 0.1 S/CO — SIGNIFICANT CHANGE UP (ref 0–0.99)
HCV AB SERPL-IMP: SIGNIFICANT CHANGE UP
HGB BLD-MCNC: 12.5 G/DL — LOW (ref 13–17)
MAGNESIUM SERPL-MCNC: 1.4 MG/DL — LOW (ref 1.6–2.6)
MCHC RBC-ENTMCNC: 32.2 PG — SIGNIFICANT CHANGE UP (ref 27–34)
MCHC RBC-ENTMCNC: 36.1 GM/DL — HIGH (ref 32–36)
MCV RBC AUTO: 89.2 FL — SIGNIFICANT CHANGE UP (ref 80–100)
NRBC # BLD: 0 /100 WBCS — SIGNIFICANT CHANGE UP (ref 0–0)
PHOSPHATE SERPL-MCNC: 3.1 MG/DL — SIGNIFICANT CHANGE UP (ref 2.5–4.5)
PLATELET # BLD AUTO: 262 K/UL — SIGNIFICANT CHANGE UP (ref 150–400)
POTASSIUM SERPL-MCNC: 5.1 MMOL/L — SIGNIFICANT CHANGE UP (ref 3.5–5.3)
POTASSIUM SERPL-SCNC: 5.1 MMOL/L — SIGNIFICANT CHANGE UP (ref 3.5–5.3)
RBC # BLD: 3.88 M/UL — LOW (ref 4.2–5.8)
RBC # FLD: 12.2 % — SIGNIFICANT CHANGE UP (ref 10.3–14.5)
SODIUM SERPL-SCNC: 134 MMOL/L — LOW (ref 135–145)
WBC # BLD: 6.47 K/UL — SIGNIFICANT CHANGE UP (ref 3.8–10.5)
WBC # FLD AUTO: 6.47 K/UL — SIGNIFICANT CHANGE UP (ref 3.8–10.5)

## 2024-02-08 PROCEDURE — 84133 ASSAY OF URINE POTASSIUM: CPT

## 2024-02-08 PROCEDURE — 86803 HEPATITIS C AB TEST: CPT

## 2024-02-08 PROCEDURE — 84156 ASSAY OF PROTEIN URINE: CPT

## 2024-02-08 PROCEDURE — 36415 COLL VENOUS BLD VENIPUNCTURE: CPT

## 2024-02-08 PROCEDURE — 84300 ASSAY OF URINE SODIUM: CPT

## 2024-02-08 PROCEDURE — 82570 ASSAY OF URINE CREATININE: CPT

## 2024-02-08 PROCEDURE — 82550 ASSAY OF CK (CPK): CPT

## 2024-02-08 PROCEDURE — 84100 ASSAY OF PHOSPHORUS: CPT

## 2024-02-08 PROCEDURE — 76882 US LMTD JT/FCL EVL NVASC XTR: CPT | Mod: 26,RT

## 2024-02-08 PROCEDURE — 82962 GLUCOSE BLOOD TEST: CPT

## 2024-02-08 PROCEDURE — 99285 EMERGENCY DEPT VISIT HI MDM: CPT

## 2024-02-08 PROCEDURE — 85027 COMPLETE CBC AUTOMATED: CPT

## 2024-02-08 PROCEDURE — 96375 TX/PRO/DX INJ NEW DRUG ADDON: CPT

## 2024-02-08 PROCEDURE — 83735 ASSAY OF MAGNESIUM: CPT

## 2024-02-08 PROCEDURE — 81001 URINALYSIS AUTO W/SCOPE: CPT

## 2024-02-08 PROCEDURE — 80048 BASIC METABOLIC PNL TOTAL CA: CPT

## 2024-02-08 PROCEDURE — 76882 US LMTD JT/FCL EVL NVASC XTR: CPT

## 2024-02-08 PROCEDURE — 99239 HOSP IP/OBS DSCHRG MGMT >30: CPT

## 2024-02-08 PROCEDURE — G0378: CPT

## 2024-02-08 PROCEDURE — 96374 THER/PROPH/DIAG INJ IV PUSH: CPT

## 2024-02-08 PROCEDURE — 82306 VITAMIN D 25 HYDROXY: CPT

## 2024-02-08 PROCEDURE — 80053 COMPREHEN METABOLIC PANEL: CPT

## 2024-02-08 PROCEDURE — 83036 HEMOGLOBIN GLYCOSYLATED A1C: CPT

## 2024-02-08 RX ORDER — PHENTERMINE HCL 30 MG
0.5 CAPSULE ORAL
Refills: 0 | DISCHARGE

## 2024-02-08 RX ADMIN — Medication 2: at 08:52

## 2024-02-08 RX ADMIN — Medication 2: at 12:33

## 2024-02-08 RX ADMIN — Medication 2000 UNIT(S): at 12:33

## 2024-02-08 RX ADMIN — Medication 1 PACKET(S): at 12:32

## 2024-02-08 RX ADMIN — PANTOPRAZOLE SODIUM 40 MILLIGRAM(S): 20 TABLET, DELAYED RELEASE ORAL at 06:34

## 2024-02-08 RX ADMIN — Medication 1 TABLET(S): at 06:34

## 2024-02-08 RX ADMIN — Medication 5 MILLIGRAM(S): at 06:34

## 2024-02-08 RX ADMIN — Medication 81 MILLIGRAM(S): at 12:33

## 2024-02-08 NOTE — DISCHARGE NOTE PROVIDER - NSDCMRMEDTOKEN_GEN_ALL_CORE_FT
aspirin 81 mg oral tablet: 1 tab(s) orally once a day  glipiZIDE 5 mg oral tablet: 1 tab(s) orally 2 times a day  Inderal 20 mg oral tablet: 0.5 tab(s) orally once a day NOTE: As per Pharmacy, 20mg tab orally 2 times a day  Januvia 50 mg oral tablet: 1 tab(s) orally once a day  lisinopril 5 mg oral tablet: 1 tab(s) orally once a day NOTE: As per Pharmacy, Last filled 8/23 90 days supply  metFORMIN 1000 mg oral tablet: 1 tab(s) orally 2 times a day  omeprazole 20 mg oral delayed release capsule: 1 cap(s) orally once a day  Psyllium Husk 500mg capsule: 1 capsule orally once a day  tamsulosin 0.4 mg oral capsule: 1 cap(s) orally once a day  VESIcare 5 mg oral tablet: 1 tab(s) orally once a day  Vitamin D 5000 IU tablet: 1 tablet orally once a day

## 2024-02-08 NOTE — DISCHARGE NOTE PROVIDER - ATTENDING DISCHARGE PHYSICAL EXAMINATION:
Vital Signs Last 24 Hrs  T(C): 36.7 (08 Feb 2024 12:05), Max: 36.8 (07 Feb 2024 17:29)  T(F): 98.1 (08 Feb 2024 12:05), Max: 98.2 (07 Feb 2024 17:29)  HR: 100 (08 Feb 2024 12:05) (81 - 105)  BP: 114/78 (08 Feb 2024 12:05) (103/67 - 144/92)  BP(mean): --  RR: 18 (08 Feb 2024 12:05) (18 - 18)  SpO2: 100% (08 Feb 2024 12:05) (96% - 100%)    Parameters below as of 08 Feb 2024 12:05  Patient On (Oxygen Delivery Method): room air        CONSTITUTIONAL: NAD, well-developed, well-groomed  EYES: PERRLA; conjunctiva and sclera clear  ENMT: Moist oral mucosa, no pharyngeal injection or exudates; normal dentition  NECK: Supple, no palpable masses; no thyromegaly  RESPIRATORY: Normal respiratory effort; lungs are clear to auscultation bilaterally  CARDIOVASCULAR: Regular rate and rhythm, normal S1 and S2, no murmur/rub/gallop; No lower extremity edema; Peripheral pulses are 2+ bilaterally  ABDOMEN: Nontender to palpation, normoactive bowel sounds, no rebound/guarding; No hepatosplenomegaly  MUSCULOSKELETAL:  Normal gait; no clubbing or cyanosis of digits; no joint swelling or tenderness to palpation  PSYCH: A+O to person, place, and time; affect appropriate  NEUROLOGY: CN 2-12 are intact and symmetric; no gross sensory deficits   SKIN: No rashes; no palpable lesions

## 2024-02-08 NOTE — DISCHARGE NOTE NURSING/CASE MANAGEMENT/SOCIAL WORK - PATIENT PORTAL LINK FT
You can access the FollowMyHealth Patient Portal offered by [facility] by registering at the following website: [email or web address]. By joining [employer]’s FollowMyHealth portal, you will also be able to view your health information using other applications (apps) compatible with our system.

## 2024-02-08 NOTE — PROGRESS NOTE ADULT - SUBJECTIVE AND OBJECTIVE BOX
[facility] NEPHROLOGY- PROGRESS NOTE    Nephrology following for hyperkalemia and CKD. Denies any acute complaints overnight.    Hospital Medications: Medications reviewed.    REVIEW OF SYSTEMS:  CONSTITUTIONAL: No weakness, fevers or chills  EYES/ENT: No visual changes;  No vertigo or throat pain   NECK: No pain or stiffness  RESPIRATORY: No cough, wheezing, hemoptysis; No shortness of breath  CARDIOVASCULAR: No chest pain or palpitations.  GASTROINTESTINAL: No abdominal or epigastric pain. No nausea, vomiting, or hematemesis; No diarrhea or constipation. No melena or hematochezia.  GENITOURINARY: No dysuria, frequency, foamy urine, urinary urgency, incontinence or hematuria  NEUROLOGICAL: No numbness or weakness  SKIN: No itching, burning, rashes, or lesions   VASCULAR: No bilateral lower extremity edema.   All other review of systems is negative unless indicated above.    VITALS:  T(F): 98.1 (02-08-24 @ 12:05), Max: 98.3 (02-07-24 @ 16:15)  HR: 100 (02-08-24 @ 12:05)  BP: 114/78 (02-08-24 @ 12:05)  RR: 18 (02-08-24 @ 12:05)  SpO2: 100% (02-08-24 @ 12:05)  Wt(kg): --  Height (cm): 165.1 (02-07 @ 00:18)  Weight (kg): 70.8 (02-07 @ 00:18)  BMI (kg/m2): 26 (02-07 @ 00:18)  BSA (m2): 1.78 (02-07 @ 00:18)    PHYSICAL EXAM:  GENERAL: NAD, well-developed  HEAD: NCAT  EYES: EOMI, PERRL, conjunctiva and sclera clear  NECK: Supple, No JVD  CHEST/LUNG: Clear to auscultation bilaterally; No wheeze  HEART: Regular rate and rhythm; No murmurs, rubs, or gallops  ABDOMEN: Soft, Nontender, Nondistended; Bowel sounds present  EXTREMITIES:  2+ Peripheral Pulses, No clubbing, cyanosis, or edema  : exam deferred as patient is in the hallway without privacy  PSYCH: AAOx3, appropriate affect  NEUROLOGY: non-focal, ackerman  SKIN: No rashes or lesions    LABS:  02-08    134<L>  |  100  |  26<H>  ----------------------------<  202<H>  5.1   |  23  |  1.77<H>    Ca    9.7      08 Feb 2024 07:13  Phos  3.1     02-08  Mg     1.4     02-08    TPro  7.2  /  Alb  4.4  /  TBili  0.4  /  DBili      /  AST  14  /  ALT  17  /  AlkPhos  85  02-07    Creatinine Trend: 1.77 <--, 2.17 <--, 2.31 <--                        12.5   6.47  )-----------( 262      ( 08 Feb 2024 07:12 )             34.6     Urine Studies:  Urinalysis Basic - ( 08 Feb 2024 07:13 )    Color:  / Appearance:  / SG:  / pH:   Gluc: 202 mg/dL / Ketone:   / Bili:  / Urobili:    Blood:  / Protein:  / Nitrite:    Leuk Esterase:  / RBC:  / WBC    Sq Epi:  / Non Sq Epi:  / Bacteria:       Creatinine, Random Urine: 82 mg/dL (02-07 @ 08:56)  Potassium, Random Urine: 44 mmol/L (02-07 @ 08:56)  Sodium, Random Urine: 143 mmol/L (02-07 @ 08:56)    RADIOLOGY & ADDITIONAL STUDIES:    < from: US Extremity Nonvasc Limited, Right (02.08.24 @ 11:51) >  IMPRESSION:    Rightgroin subcutaneous tissue with overlying erythema and suspected   cutaneous sinus tract. No discrete fluid collection amenable to drainage.    < end of copied text >

## 2024-02-08 NOTE — DISCHARGE NOTE PROVIDER - NSDCCPCAREPLAN_GEN_ALL_CORE_FT
PRINCIPAL DISCHARGE DIAGNOSIS  Diagnosis: Hyperkalemia  Assessment and Plan of Treatment: Resolved.         SECONDARY DISCHARGE DIAGNOSES  Diagnosis: Perineal abscess  Assessment and Plan of Treatment: You completed a course of antibiotics and do not require further course at this time.   Follow up with [facility] PCP.    Diagnosis: ESE (acute kidney injury)  Assessment and Plan of Treatment: Follow up with your PCP and/or nephrologist.   Avoid taking (NSAIDs) - (ex: Ibuprofen, Advil, Celebrex, Naprosyn)  Avoid taking any nephrotoxic agents (can harm kidneys) - Intravenous contrast for diagnostic testing, combination cold medications.  Have all medications adjusted for your renal function by your Health Care Provider.  Blood pressure control is important.  Take all medication as prescribed.

## 2024-02-08 NOTE — DISCHARGE NOTE PROVIDER - CARE PROVIDERS DIRECT ADDRESSES
[email or web address]

## 2024-02-08 NOTE — PROGRESS NOTE ADULT - ASSESSMENT
63 year old male with history of HTN, BPH, Type 2 Diabetes Mellitus presents to the hospital due to emergent abnormal labs, specifically a potassium of 7.7 mEq/L.    1. Hyperkalemia: Potassium on results from ER are not life threatening; values are 5.8 and 5.4 mEq/L. Patient does not require dialysis or any emergent interventions. Pamphlet of low Potassium recommendations provided to the patient.    2. HTN w/ CKD: Goal BP is < 130/80. Lisinopril is a renal and cardio protective medication; would recommend continuing this medication and attempting low K+ diet to control any elevations in Potassium.     3. CKD stage IIIB: Kidney function was reportedly at eGFR 46 prior to referral to [facility]. Of note, patient had been taking bactrim for management of an abscess. Bactrim is associated with elevations in serum creatinine and serum potassium. Monitor kidney function and potassium levels when off of Bactrim. Avoid any additional nephrotoxins unless emergently indicated.    4. BPH: Continue Flomax and Vesicare. Monitor urinary output accurately.    [facility] NEPHROLOGY  Elmer Vidal M.D.  Isra Lujan D.O.  Zuly Preston M.D.  MD Pushpa Raphael, MSN, ANP-C    Telephone: [phone]  Facsimile: [phone] [place] [address], [address]  [place] [id number]

## 2024-02-08 NOTE — DISCHARGE NOTE PROVIDER - CARE PROVIDER_API CALL
Pedro Whiteside  Internal Medicine  [address] [place], [place] [address]  Phone: [phone]  Fax: [phone]  Established Patient  Follow Up Time: 1 week

## 2024-02-08 NOTE — DISCHARGE NOTE PROVIDER - NSDCFUADDAPPT_GEN_ALL_CORE_FT
APPTS ARE READY TO BE MADE: [x ] YES    Best Family or Patient Contact (if needed):    Additional Information about above appointments (if needed):    1: Follow up with PCP to repeat BMP  2:   3:     Other comments or requests:    APPTS ARE READY TO BE MADE: [x ] YES    Best Family or Patient Contact (if needed):    Additional Information about above appointments (if needed):    1: Follow up with PCP to repeat BMP  2:   3:     Other comments or requests:   Patient was provided with follow up request details and was advised to call to schedule follow up within specified time frame. No scheduling assistance is needed at this time.   APPTS ARE READY TO BE MADE: [x ] YES    Best Family or Patient Contact (if needed):    Additional Information about above appointments (if needed):    1: Follow up with PCP to repeat BMP  2:   3:     Other comments or requests:   Patient was provided with follow up request details and was advised to call to schedule follow up within specified time frame. No scheduling assistance is needed at this time.    Patient informed us they already have secured a follow up appointment which is not visible on [facility] and declined to provide appointment details.

## 2024-02-08 NOTE — DISCHARGE NOTE PROVIDER - HOSPITAL COURSE
HPI:  63M with hx of HTN, T2DM on oral medications, BPH sent in by nephrology office for K of 7.7 on outpatient labs. In the last month, patient was started on an appetite suppressant phentermine. He was diagnosed with a perineal abscess about 2 weeks ago, which spontaneously ruptured in the doctor's office. He was rx on bactrim DS for 2 weeks, which he has about 1 day left of medication. In the last two weeks, he has also been having intermittent fatigue, nausea, emesis, and generalized malaise. He denies any other recent illnesses or respiratory symptoms. Today, however, he feels better, and denies any acute symptoms. He still complains of swelling in his perineal area, with no drainage.     In the interim, his creatinine radha from baseline of around 1.5 about a month ago, to 2.2 and sent to a nephrologist for initial consultation, where he was reported to have a K of 7.7 and called to immediately go to the ED for life threatening hyperkalemia. In the ED, K was 5.8 without EKG changes, and he received insulin 5u, albuterol, sodium bicarb, and lokelma. Repeat potassium was 5.4.  (07 Feb 2024 14:00)    Hospital Course:  63M with hx of HTN, T2DM on oral medications, BPH sent in by nephrology office for K of 7.7 on outpatient labs, admitted for K monitoring and r/o abscess.   Hyperkalemia now resolved s/p lokelma. For ESE on CKD III,  Cr was around 1.5 a month ago, now ~2.2 in setting of bactrim use. C/b hyperkalemia.  UA without proteinuria or signs of GN. Nephrology consulted: Potassium on results from ER are not life threatening; values are 5.8 and 5.4 mEq/L. Patient does not require dialysis or any emergent interventions. Pamphlet of low Potassium recommendations provided to the patient.  Of note, Bactrim is associated with elevations in serum creatinine and serum potassium.  Kidney function monitored, Cr now improving to 1.77 on day of discharge.   For perineal abscess, pt was treated with Bactrim x 14 days. Does not require further abx course at this time. US showed Right groin subcutaneous tissue with overlying erythema and suspected   cutaneous sinus tract. No discrete fluid collection amenable to drainage.  Pt now medically stable to be discharged home.  Discharge discussed with attending Dr. Lizarraga.     Important Medication Changes and Reason:    Active or Pending Issues Requiring Follow-up:    Advanced Directives:   [x ] Full code  [ ] DNR  [ ] Hospice    Discharge Diagnoses:  ESE on CKD  Hyperkalemia  Perineal abscess

## 2024-02-08 NOTE — DISCHARGE NOTE NURSING/CASE MANAGEMENT/SOCIAL WORK - NSDCFUADDAPPT_GEN_ALL_CORE_FT
APPTS ARE READY TO BE MADE: [x ] YES    Best Family or Patient Contact (if needed):    Additional Information about above appointments (if needed):    1: Follow up with PCP to repeat BMP  2:   3:     Other comments or requests:

## 2024-03-13 PROBLEM — N40.0 BENIGN PROSTATIC HYPERPLASIA WITHOUT LOWER URINARY TRACT SYMPTOMS: Chronic | Status: ACTIVE | Noted: 2024-02-07

## 2024-03-22 ENCOUNTER — APPOINTMENT (OUTPATIENT)
Dept: PODIATRY | Facility: CLINIC | Age: 64
End: 2024-03-22
Payer: COMMERCIAL

## 2024-03-22 DIAGNOSIS — I73.00 RAYNAUD'S SYNDROME W/OUT GANGRENE: ICD-10-CM

## 2024-03-22 PROCEDURE — 99212 OFFICE O/P EST SF 10 MIN: CPT

## 2024-04-01 PROBLEM — I73.00 RAYNAUDS DISEASE: Status: ACTIVE | Noted: 2024-03-26

## 2024-04-01 NOTE — ASSESSMENT
[FreeTextEntry1] : Impression: Raynaud's. Type 2 diabetes. Onychomycosis.  Treatment: I debrided mycotic nails without incident. No need to take any more oral meds. We will see how he improves and responds over time. I discussed Raynaud's and appropriate shoe gear to wear to keep the toes warm. I encouraged him to walk. Discussed diabetic foot care. Follow-up in the office for evaluation in 3 months.

## 2024-04-01 NOTE — HISTORY OF PRESENT ILLNESS
[FreeTextEntry1] : Patient presents today for diabetic foot care. His A1c is 6.1.  He took oral antifungals and notes improvement. He still has significant onychomycosis of the right side. He also has Raynaud's with today's evaluation.

## 2024-04-01 NOTE — PHYSICAL EXAM
[Delayed in the Right Toes] : capillary refills delayed in the right toes [Delayed in the Left Toes] : capillary refills delayed in the left toes [2+] : left foot dorsalis pedis 2+ [Sensation] : the sensory exam was normal to light touch and pinprick [No Focal Deficits] : no focal deficits [Deep Tendon Reflexes (DTR)] : deep tendon reflexes were 2+ and symmetric [Motor Exam] : the motor exam was normal [Ankle Swelling (On Exam)] : not present [Varicose Veins Of Lower Extremities] : not present [FreeTextEntry3] : Hair growth noted on digits. Proximal to distal cooling is within normal limits.  [de-identified] : Patient has Raynaud's disease to the toes. [FreeTextEntry1] : He has onychomycosis in nails 1 through 5 on the right. They are yellow, thick, brittle with subungual debris and mycosis but much improved from previous.

## 2024-06-07 ENCOUNTER — APPOINTMENT (OUTPATIENT)
Dept: PODIATRY | Facility: CLINIC | Age: 64
End: 2024-06-07
Payer: COMMERCIAL

## 2024-06-07 DIAGNOSIS — B35.1 TINEA UNGUIUM: ICD-10-CM

## 2024-06-07 DIAGNOSIS — E11.9 TYPE 2 DIABETES MELLITUS W/OUT COMPLICATIONS: ICD-10-CM

## 2024-06-07 DIAGNOSIS — M79.676 PAIN IN UNSPECIFIED TOE(S): ICD-10-CM

## 2024-06-07 PROCEDURE — 99212 OFFICE O/P EST SF 10 MIN: CPT

## 2024-06-10 PROBLEM — E11.9 DIABETES TYPE 2, CONTROLLED: Status: ACTIVE | Noted: 2024-03-26

## 2024-06-16 PROBLEM — M79.676 TOE PAIN: Status: ACTIVE | Noted: 2024-06-10

## 2024-06-16 NOTE — ASSESSMENT
[FreeTextEntry1] : Impression: Diabetes type 2, controlled. Onychomycosis. Pain.  Treatment: I manually and mechanically debrided and debulked mycotic nails without incident. He could continue the topical prescription medications. Discussed diabetic foot care. Follow-up in the office for evaluation as needed.

## 2024-06-16 NOTE — PHYSICAL EXAM
[Delayed in the Right Toes] : capillary refills delayed in the right toes [Delayed in the Left Toes] : capillary refills delayed in the left toes [2+] : left foot dorsalis pedis 2+ [Sensation] : the sensory exam was normal to light touch and pinprick [No Focal Deficits] : no focal deficits [Deep Tendon Reflexes (DTR)] : deep tendon reflexes were 2+ and symmetric [Motor Exam] : the motor exam was normal [Ankle Swelling (On Exam)] : not present [Varicose Veins Of Lower Extremities] : not present [FreeTextEntry3] : Hair growth noted on digits. Proximal to distal cooling is within normal limits.  [de-identified] : Patient has Raynaud's disease to the toes. [FreeTextEntry1] : Onychomycosis, but minor and trending better. Good response to the oral Lamisil. Fungus still present and painful in multiple nails. He has onychomycosis in nails 1 through 5 on the right. They are yellow, thick, brittle with subungual debris and mycosis but much improved from previous. Painful at the tops and tips.

## 2024-06-16 NOTE — HISTORY OF PRESENT ILLNESS
[FreeTextEntry1] : Patient presents today for diabetic foot care. A1c is 6.4. He had taken Lamisil for treatment of the fungus infection and overall is doing better.